# Patient Record
(demographics unavailable — no encounter records)

---

## 2024-10-10 NOTE — ASSESSMENT
[Use of independent historian: [ enter independent historian's relationship to patient ] :____] : As the patient was unable to provide a complete and reliable history, I obtained clinical history from the patient's [unfilled] [FreeTextEntry1] : #acne, inflammatory and comedonal #acne scarring chronic, flaring, failed doxy, failed tretinoin On accutane since 2/2024 - education, counseling - weight 59kg - cumulative dose to date: 8100mg (137 mg/kg)     - 1st month 2/24-3/24: 20mg daily     - 2nd month 3/2024-4/2024 40mg daily      - 3rd month 4/2024-5/2024 40mg daily      - 4th month 5/2024-6/2024 40mg daily     - 5th month 6/2024-7/2024 40mg daily     - 6th month 8/2024-9/2024 40mg daily (prescribed typically at end of month hence rollover to Aug/Sept)     - 7th month 9/2024-10/1024 50mg daily DECREASE dose to 30mg daily for last month  Major side effects and risks reviewed in detail. Chief among these are teratogensis, hepatic injury, dyslipidemia, and severe drying of the mucous membranes. Episodes of significant depression have been reported, including suicidal ideation and attempts in rare cases. It may also cause pseudotumor cerebri and hyperostosis. The patient will report any such changes in mood, depressive symptoms or suicidal thoughts, headaches, joint or bone pains.   #High risk medication use --reviewed Hepatic panel, lipid panel reviewed--stable (Jul 2024)  #Angular chelitis, chronic; improved  We have discussed the nature and course of this condition. I have discussed the goals of therapy with the patient. We have discussed treatment options and expectations from treatment. -- c/w mycolog as needed  NOT ADDRESSED # recurrent dermatitis on cheeks, favor irritant/eczematous, chronic, improved today counselled that accutane may cause flare, can use topicals below prn  - education, counseling - desonide ointment BID x 1 week at most. SED - elidel as maint. SED - gentle skin care discussed.  RTC 3-4 month

## 2024-10-10 NOTE — HISTORY OF PRESENT ILLNESS
[FreeTextEntry1] : f/u acne [de-identified] : 15 yo M here for f/u:  #acne On accutane s/p 6 month, most recent dose 40mg daily, acne improving, cheilitis has improved with mycolog cream daily and daily application of Vaseline  8/8/2024: Recently with two pimples in past few weeks. Patient will be attending summer camp and will be outdoors for next few weeks 9/10/2024: Patient with few pimples over past few weeks. Endorses lip dryness currently treating with topicals with improvement. Denies any mood changes.

## 2025-01-15 NOTE — HISTORY OF PRESENT ILLNESS
[FreeTextEntry1] : f/u acne [de-identified] : 15 yo M here for f/u, here with mom  #acne, s/p 8 mos of accutane (stopped 11/2024), with no recurrence to date # cheilitis, comes and goes, improves with mycolog cream-he has this at home and does not need refills

## 2025-01-15 NOTE — HISTORY OF PRESENT ILLNESS
[FreeTextEntry1] : f/u acne [de-identified] : 15 yo M here for f/u, here with mom  #acne, s/p 8 mos of accutane (stopped 11/2024), with no recurrence to date # cheilitis, comes and goes, improves with mycolog cream-he has this at home and does not need refills

## 2025-01-15 NOTE — ASSESSMENT
[Use of independent historian: [ enter independent historian's relationship to patient ] :____] : As the patient was unable to provide a complete and reliable history, I obtained clinical history from the patient's [unfilled] [FreeTextEntry1] : #acne, inflammatory and comedonal--RESOLVED s/p 8 mo course of accutane #acne scarring accutane 2/24-11/24 - weight 59kg - cumulative dose to date: 9000mg(153 mg/kg)     - 1st month 2/24-3/24: 20mg daily     - 2nd month 3/2024-4/2024 40mg daily      - 3rd month 4/2024-5/2024 40mg daily      - 4th month 5/2024-6/2024 40mg daily     - 5th month 6/2024-7/2024 40mg daily     - 6th month 8/2024-9/2024 40mg daily (prescribed typically at end of month hence rollover to Aug/Sept)     - 7th month 9/2024-10/1024 50mg daily     - 8th month 10/2024-11/2024 30mg daily  #Angular chelitis, chronic; improved  We have discussed the nature and course of this condition. I have discussed the goals of therapy with the patient. We have discussed treatment options and expectations from treatment. -- c/w mycolog as needed  RTC PRN  NOT ADDRESSED # recurrent dermatitis on cheeks, favor irritant/eczematous, chronic, improved today counselled that accutane may cause flare, can use topicals below prn  - education, counseling - desonide ointment BID x 1 week at most. SED - elidel as maint. SED - gentle skin care discussed.